# Patient Record
Sex: MALE | Race: WHITE | Employment: OTHER | URBAN - METROPOLITAN AREA
[De-identification: names, ages, dates, MRNs, and addresses within clinical notes are randomized per-mention and may not be internally consistent; named-entity substitution may affect disease eponyms.]

---

## 2017-01-27 ENCOUNTER — OFFICE VISIT (OUTPATIENT)
Dept: INTERNAL MEDICINE | Age: 42
End: 2017-01-27

## 2017-01-27 VITALS
BODY MASS INDEX: 30.22 KG/M2 | SYSTOLIC BLOOD PRESSURE: 100 MMHG | DIASTOLIC BLOOD PRESSURE: 70 MMHG | HEIGHT: 73 IN | WEIGHT: 228 LBS

## 2017-01-27 DIAGNOSIS — L98.9 SKIN LESION OF CHEST WALL: Primary | ICD-10-CM

## 2017-01-27 PROCEDURE — 99213 OFFICE O/P EST LOW 20 MIN: CPT | Performed by: INTERNAL MEDICINE

## 2017-01-27 ASSESSMENT — ENCOUNTER SYMPTOMS
EYE REDNESS: 0
ABDOMINAL PAIN: 0
BACK PAIN: 0
SHORTNESS OF BREATH: 0
CHEST TIGHTNESS: 0
NAUSEA: 0

## 2017-07-12 ENCOUNTER — OFFICE VISIT (OUTPATIENT)
Dept: DERMATOLOGY | Age: 42
End: 2017-07-12

## 2017-07-12 DIAGNOSIS — D18.00 HEMANGIOMA: Primary | ICD-10-CM

## 2017-07-12 DIAGNOSIS — D22.9 BENIGN NEVUS: ICD-10-CM

## 2017-07-12 DIAGNOSIS — D48.5 NEOPLASM OF UNCERTAIN BEHAVIOR OF SKIN: ICD-10-CM

## 2017-07-12 DIAGNOSIS — L57.8 DIFFUSE PHOTODAMAGE OF SKIN: ICD-10-CM

## 2017-07-12 PROCEDURE — 99242 OFF/OP CONSLTJ NEW/EST SF 20: CPT | Performed by: DERMATOLOGY

## 2017-07-12 PROCEDURE — 11301 SHAVE SKIN LESION 0.6-1.0 CM: CPT | Performed by: DERMATOLOGY

## 2017-07-12 PROCEDURE — 11100 PR BIOPSY OF SKIN LESION: CPT | Performed by: DERMATOLOGY

## 2017-07-12 PROCEDURE — 11101 PR BIOPSY, EACH ADDED LESION: CPT | Performed by: DERMATOLOGY

## 2017-07-20 ENCOUNTER — TELEPHONE (OUTPATIENT)
Dept: DERMATOLOGY | Age: 42
End: 2017-07-20

## 2017-11-03 ENCOUNTER — OFFICE VISIT (OUTPATIENT)
Dept: INTERNAL MEDICINE | Age: 42
End: 2017-11-03

## 2017-11-03 VITALS
DIASTOLIC BLOOD PRESSURE: 70 MMHG | BODY MASS INDEX: 29.82 KG/M2 | OXYGEN SATURATION: 96 % | TEMPERATURE: 98.8 F | WEIGHT: 225 LBS | HEART RATE: 74 BPM | HEIGHT: 73 IN | SYSTOLIC BLOOD PRESSURE: 110 MMHG

## 2017-11-03 DIAGNOSIS — J40 BRONCHITIS: Primary | ICD-10-CM

## 2017-11-03 DIAGNOSIS — Z23 NEEDS FLU SHOT: ICD-10-CM

## 2017-11-03 PROCEDURE — 99213 OFFICE O/P EST LOW 20 MIN: CPT | Performed by: NURSE PRACTITIONER

## 2017-11-03 PROCEDURE — 90686 IIV4 VACC NO PRSV 0.5 ML IM: CPT | Performed by: NURSE PRACTITIONER

## 2017-11-03 PROCEDURE — 90471 IMMUNIZATION ADMIN: CPT | Performed by: NURSE PRACTITIONER

## 2017-11-03 RX ORDER — METHYLPREDNISOLONE 4 MG/1
TABLET ORAL
Qty: 1 KIT | Refills: 0 | Status: SHIPPED | OUTPATIENT
Start: 2017-11-03 | End: 2017-11-09

## 2017-11-03 NOTE — PROGRESS NOTES
Subjective:      Patient ID: Charity Bernal is a 40 y.o. male. HPI   Patient comes to the office complaining of cough, congestion, drainage that has been present for the last several days. His cough is productive of yellow sputum. Patient does not have associated fever or chills. Patient denies gastrointestinal symptoms related to His present condition. Taking nyquil daily, which has been helping, but symptoms lingering for 4 weeks. Denies any sob, cp, or wheezing. Past Medical History:   Diagnosis Date    GERD (gastroesophageal reflux disease)      History reviewed. No pertinent surgical history. Family History   Problem Relation Age of Onset    Hypertension Mother     Heart Disease Father 46     Social History     Social History    Marital status:      Spouse name: N/A    Number of children: 2    Years of education: N/A     Occupational History    Not on file. Social History Main Topics    Smoking status: Never Smoker    Smokeless tobacco: Never Used    Alcohol use Yes      Comment: occasionally    Drug use: No    Sexual activity: Yes     Partners: Female     Other Topics Concern    Not on file     Social History Narrative    No narrative on file       Review of Systems   Constitutional: Positive for fatigue. Negative for chills, diaphoresis and fever. HENT: Positive for postnasal drip, rhinorrhea, sinus pain and sinus pressure. Negative for congestion. Respiratory: Positive for cough. Negative for shortness of breath and wheezing. Cardiovascular: Negative for chest pain and palpitations. Allergic/Immunologic: Positive for environmental allergies. Neurological: Positive for headaches. Hematological: Negative for adenopathy.        Objective:     Vitals:    11/03/17 1049   BP: 110/70   Site: Left Arm   Position: Sitting   Cuff Size: Medium Adult   Pulse: 74   Temp: 98.8 °F (37.1 °C)   SpO2: 96%   Weight: 225 lb (102.1 kg)   Height: 6' 1\" (1.854 m)     Physical Exam Constitutional: He appears well-developed and well-nourished. HENT:   Right Ear: Hearing, tympanic membrane, external ear and ear canal normal.   Left Ear: Hearing, tympanic membrane, external ear and ear canal normal.   Nose: No mucosal edema or rhinorrhea. Right sinus exhibits no maxillary sinus tenderness and no frontal sinus tenderness. Left sinus exhibits no maxillary sinus tenderness and no frontal sinus tenderness. Mouth/Throat: No oropharyngeal exudate, posterior oropharyngeal edema, posterior oropharyngeal erythema or tonsillar abscesses. Cardiovascular: Normal rate, regular rhythm and normal heart sounds. Pulmonary/Chest: Effort normal. He has decreased breath sounds in the right lower field and the left lower field. He has wheezes in the right lower field and the left lower field. Lymphadenopathy:        Head (right side): No submental, no submandibular, no tonsillar, no preauricular, no posterior auricular and no occipital adenopathy present. Head (left side): No submental, no submandibular, no tonsillar, no preauricular, no posterior auricular and no occipital adenopathy present. He has no cervical adenopathy. Skin: Skin is warm and dry. Current Outpatient Prescriptions   Medication Sig Dispense Refill    methylPREDNISolone (MEDROL DOSEPACK) 4 MG tablet Take by mouth. 1 kit 0    omeprazole (PRILOSEC) 20 MG capsule Take 20 mg by mouth daily. No current facility-administered medications for this visit. Assessment:     1. Bronchitis    2. Needs flu shot      Plan:   1. Bronchitis  - Antibiotic not indicated, discussed rationale of not giving antibiotics due to, but not limited to, side effects and build up of resistance. Advised if fever develops, symptoms worsen, or fail to improve with symptom management to return to office.    - methylPREDNISolone (MEDROL DOSEPACK) 4 MG tablet; Take by mouth. Dispense: 1 kit; Refill: 0    2.  Needs flu shot  - Alina Wilson 3 YRS AND OLDER, IM, PF, PREFILL SYR OR SDV, 0.5ML (FLUZONE QUADV, PF)        Discussed use, benefit, and side effects of all prescribed medications. Barriers to medication compliance addressed. All patient questions answered. Pt voiced understanding. All patient questions answered. Pt voiced understanding.

## 2017-11-06 ASSESSMENT — ENCOUNTER SYMPTOMS
SINUS PAIN: 1
SHORTNESS OF BREATH: 0
WHEEZING: 0
SINUS PRESSURE: 1
RHINORRHEA: 1
COUGH: 1

## 2019-12-30 ENCOUNTER — TELEPHONE (OUTPATIENT)
Dept: INTERNAL MEDICINE CLINIC | Age: 44
End: 2019-12-30

## 2019-12-30 DIAGNOSIS — L30.9 DERMATITIS: Primary | ICD-10-CM

## 2020-01-10 ENCOUNTER — TELEPHONE (OUTPATIENT)
Dept: INTERNAL MEDICINE CLINIC | Age: 45
End: 2020-01-10

## 2020-01-10 NOTE — TELEPHONE ENCOUNTER
[phone] (home)   Sent to MD GLORY ÁLVAREZ on pt phone.  I attempted to get a little more information on why pt needs referral for Dermatology group

## 2021-02-17 ENCOUNTER — TELEPHONE (OUTPATIENT)
Dept: INTERNAL MEDICINE CLINIC | Age: 46
End: 2021-02-17

## 2021-02-17 DIAGNOSIS — R07.89 OTHER CHEST PAIN: Primary | ICD-10-CM

## 2021-02-17 NOTE — TELEPHONE ENCOUNTER
Stress test ordered, he can call scheduling at [phone] to check about getting a date set for the test.     Orders Placed This Encounter   Procedures    CARDIAC STRESS TEST EXERCISE ONLY     Standing Status:   Future     Standing Expiration Date:   8/17/2021     Order Specific Question:   Reason for Exam?     Answer:   Chest pain     Order Specific Question:   Reason for Exam?     Answer:   Shortness of breath

## 2021-02-19 ENCOUNTER — HOSPITAL ENCOUNTER (OUTPATIENT)
Dept: NON INVASIVE DIAGNOSTICS | Age: 46
Discharge: HOME | End: 2021-02-19
Payer: OTHER GOVERNMENT

## 2021-02-19 DIAGNOSIS — R07.89 OTHER CHEST PAIN: ICD-10-CM

## 2021-02-19 PROCEDURE — 93017 CV STRESS TEST TRACING ONLY: CPT

## 2023-01-06 ENCOUNTER — OFFICE VISIT (OUTPATIENT)
Dept: URGENT CARE | Age: 48
End: 2023-01-06

## 2023-01-06 VITALS
TEMPERATURE: 98 F | OXYGEN SATURATION: 97 % | BODY MASS INDEX: 29.03 KG/M2 | RESPIRATION RATE: 12 BRPM | HEART RATE: 68 BPM | HEIGHT: 73 IN | WEIGHT: 219 LBS | DIASTOLIC BLOOD PRESSURE: 70 MMHG | SYSTOLIC BLOOD PRESSURE: 110 MMHG

## 2023-01-06 DIAGNOSIS — J20.9 ACUTE BRONCHITIS, UNSPECIFIED ORGANISM: Primary | ICD-10-CM

## 2023-01-06 RX ORDER — BENZONATATE 200 MG/1
200 CAPSULE ORAL 3 TIMES DAILY PRN
Qty: 21 CAPSULE | Refills: 0 | Status: SHIPPED | OUTPATIENT
Start: 2023-01-06 | End: 2023-01-13

## 2023-01-06 RX ORDER — ALBUTEROL SULFATE 90 UG/1
2 AEROSOL, METERED RESPIRATORY (INHALATION) EVERY 6 HOURS PRN
Qty: 18 G | Refills: 0 | Status: SHIPPED | OUTPATIENT
Start: 2023-01-06

## 2023-01-06 RX ORDER — PREDNISONE 20 MG/1
20 TABLET ORAL 2 TIMES DAILY
Qty: 10 TABLET | Refills: 0 | Status: SHIPPED | OUTPATIENT
Start: 2023-01-06 | End: 2023-01-11

## 2023-01-06 ASSESSMENT — ENCOUNTER SYMPTOMS
SINUS PAIN: 0
STRIDOR: 0
SORE THROAT: 1
COUGH: 1
SINUS PRESSURE: 0
SHORTNESS OF BREATH: 0
CHOKING: 0
APNEA: 0
WHEEZING: 0
CHEST TIGHTNESS: 0
GASTROINTESTINAL NEGATIVE: 1

## 2023-01-06 NOTE — PROGRESS NOTES
Lesa De Anda (:  1975) is a 56 y.o. male,New patient, here for evaluation of the following chief complaint(s):  Cough (X month and a half)      ASSESSMENT/PLAN:    ICD-10-CM    1. Acute bronchitis, unspecified organism  J20.9 predniSONE (DELTASONE) 20 MG tablet     albuterol sulfate HFA (PROVENTIL HFA) 108 (90 Base) MCG/ACT inhaler     benzonatate (TESSALON) 200 MG capsule          Return if symptoms worsen or fail to improve. SUBJECTIVE/OBJECTIVE:  56year old male presents with c/o persistent cough for 1 month. He has mild sore throat with PND. Denies known fever, chills or body aches. He denies known sick exposure. He has been treating with Dayquil and Delsym and cough drops with short term. Last taken 12 hours ago. Denies ATB treatment in the past 6 months. History provided by:  Patient   used: No      Vitals:    23 1048   BP: 110/70   Pulse: 68   Resp: 12   Temp: 98 °F (36.7 °C)   TempSrc: Oral   SpO2: 97%   Weight: 219 lb (99.3 kg)   Height: 6' 1\" (1.854 m)       Review of Systems   Constitutional: Negative. HENT:  Positive for sore throat. Negative for congestion, ear pain, sinus pressure and sinus pain. Respiratory:  Positive for cough. Negative for apnea, choking, chest tightness, shortness of breath, wheezing and stridor. Dry non-productive cough    Cardiovascular: Negative. Gastrointestinal: Negative. Neurological:  Negative for headaches. Physical Exam  Vitals reviewed. Constitutional:       General: He is not in acute distress. Appearance: Normal appearance. He is not ill-appearing. HENT:      Head: Normocephalic. Right Ear: Tympanic membrane, ear canal and external ear normal. There is no impacted cerumen. Left Ear: Tympanic membrane, ear canal and external ear normal. There is no impacted cerumen. Nose: Nose normal. No congestion or rhinorrhea.       Mouth/Throat:      Mouth: Mucous membranes are moist.      Pharynx: Posterior oropharyngeal erythema present. No oropharyngeal exudate. Cardiovascular:      Rate and Rhythm: Normal rate and regular rhythm. Pulmonary:      Effort: Pulmonary effort is normal.      Breath sounds: Normal breath sounds. Comments: Dry hacking cough on exam   Skin:     General: Skin is warm and dry. Neurological:      Mental Status: He is alert and oriented to person, place, and time. Psychiatric:         Behavior: Behavior normal.         An electronic signature was used to authenticate this note.     --AGUSTÍN Valdez - CNP

## 2023-01-16 SDOH — HEALTH STABILITY: PHYSICAL HEALTH: ON AVERAGE, HOW MANY DAYS PER WEEK DO YOU ENGAGE IN MODERATE TO STRENUOUS EXERCISE (LIKE A BRISK WALK)?: 4 DAYS

## 2023-01-16 SDOH — HEALTH STABILITY: PHYSICAL HEALTH: ON AVERAGE, HOW MANY MINUTES DO YOU ENGAGE IN EXERCISE AT THIS LEVEL?: 50 MIN

## 2023-01-17 ENCOUNTER — OFFICE VISIT (OUTPATIENT)
Dept: PRIMARY CARE CLINIC | Age: 48
End: 2023-01-17
Payer: OTHER GOVERNMENT

## 2023-01-17 ENCOUNTER — HOSPITAL ENCOUNTER (OUTPATIENT)
Dept: GENERAL RADIOLOGY | Age: 48
Discharge: HOME | End: 2023-01-17
Payer: OTHER GOVERNMENT

## 2023-01-17 VITALS
TEMPERATURE: 98.3 F | OXYGEN SATURATION: 98 % | SYSTOLIC BLOOD PRESSURE: 110 MMHG | WEIGHT: 220 LBS | DIASTOLIC BLOOD PRESSURE: 80 MMHG | BODY MASS INDEX: 29.16 KG/M2 | HEIGHT: 73 IN | HEART RATE: 86 BPM

## 2023-01-17 DIAGNOSIS — N52.9 ERECTILE DYSFUNCTION, UNSPECIFIED ERECTILE DYSFUNCTION TYPE: ICD-10-CM

## 2023-01-17 DIAGNOSIS — R05.3 CHRONIC COUGH: ICD-10-CM

## 2023-01-17 DIAGNOSIS — K21.9 GASTROESOPHAGEAL REFLUX DISEASE, UNSPECIFIED WHETHER ESOPHAGITIS PRESENT: ICD-10-CM

## 2023-01-17 DIAGNOSIS — N40.1 BENIGN PROSTATIC HYPERPLASIA WITH NOCTURIA: ICD-10-CM

## 2023-01-17 DIAGNOSIS — R35.1 BENIGN PROSTATIC HYPERPLASIA WITH NOCTURIA: ICD-10-CM

## 2023-01-17 DIAGNOSIS — R05.3 CHRONIC COUGH: Primary | ICD-10-CM

## 2023-01-17 DIAGNOSIS — Z13.1 SCREENING FOR DIABETES MELLITUS: ICD-10-CM

## 2023-01-17 DIAGNOSIS — Z13.220 ENCOUNTER FOR SCREENING FOR LIPOID DISORDERS: ICD-10-CM

## 2023-01-17 DIAGNOSIS — R35.0 URINARY FREQUENCY: ICD-10-CM

## 2023-01-17 PROCEDURE — 71046 X-RAY EXAM CHEST 2 VIEWS: CPT

## 2023-01-17 PROCEDURE — 99204 OFFICE O/P NEW MOD 45 MIN: CPT | Performed by: STUDENT IN AN ORGANIZED HEALTH CARE EDUCATION/TRAINING PROGRAM

## 2023-01-17 RX ORDER — TAMSULOSIN HYDROCHLORIDE 0.4 MG/1
0.4 CAPSULE ORAL DAILY
Qty: 30 CAPSULE | Refills: 0 | Status: SHIPPED | OUTPATIENT
Start: 2023-01-17

## 2023-01-17 SDOH — ECONOMIC STABILITY: FOOD INSECURITY: WITHIN THE PAST 12 MONTHS, THE FOOD YOU BOUGHT JUST DIDN'T LAST AND YOU DIDN'T HAVE MONEY TO GET MORE.: NEVER TRUE

## 2023-01-17 SDOH — ECONOMIC STABILITY: FOOD INSECURITY: WITHIN THE PAST 12 MONTHS, YOU WORRIED THAT YOUR FOOD WOULD RUN OUT BEFORE YOU GOT MONEY TO BUY MORE.: NEVER TRUE

## 2023-01-17 ASSESSMENT — PATIENT HEALTH QUESTIONNAIRE - PHQ9
SUM OF ALL RESPONSES TO PHQ QUESTIONS 1-9: 0
SUM OF ALL RESPONSES TO PHQ QUESTIONS 1-9: 0
1. LITTLE INTEREST OR PLEASURE IN DOING THINGS: 0
SUM OF ALL RESPONSES TO PHQ QUESTIONS 1-9: 0
7. TROUBLE CONCENTRATING ON THINGS, SUCH AS READING THE NEWSPAPER OR WATCHING TELEVISION: 0
8. MOVING OR SPEAKING SO SLOWLY THAT OTHER PEOPLE COULD HAVE NOTICED. OR THE OPPOSITE, BEING SO FIGETY OR RESTLESS THAT YOU HAVE BEEN MOVING AROUND A LOT MORE THAN USUAL: 0
SUM OF ALL RESPONSES TO PHQ QUESTIONS 1-9: 0
3. TROUBLE FALLING OR STAYING ASLEEP: 0
5. POOR APPETITE OR OVEREATING: 0
2. FEELING DOWN, DEPRESSED OR HOPELESS: 0
10. IF YOU CHECKED OFF ANY PROBLEMS, HOW DIFFICULT HAVE THESE PROBLEMS MADE IT FOR YOU TO DO YOUR WORK, TAKE CARE OF THINGS AT HOME, OR GET ALONG WITH OTHER PEOPLE: 0
6. FEELING BAD ABOUT YOURSELF - OR THAT YOU ARE A FAILURE OR HAVE LET YOURSELF OR YOUR FAMILY DOWN: 0
SUM OF ALL RESPONSES TO PHQ9 QUESTIONS 1 & 2: 0
9. THOUGHTS THAT YOU WOULD BE BETTER OFF DEAD, OR OF HURTING YOURSELF: 0
4. FEELING TIRED OR HAVING LITTLE ENERGY: 0

## 2023-01-17 ASSESSMENT — SOCIAL DETERMINANTS OF HEALTH (SDOH): HOW HARD IS IT FOR YOU TO PAY FOR THE VERY BASICS LIKE FOOD, HOUSING, MEDICAL CARE, AND HEATING?: NOT HARD AT ALL

## 2023-01-17 NOTE — PATIENT INSTRUCTIONS
Lifestyle modifications include:    ?Limiting fluid intake before bedtime or prior to travel    ? Limiting intake of mild diuretics (eg, caffeine, alcohol)    ? Limiting intake of bladder irritants (eg, highly seasoned or irritative foods)    ? Avoiding constipation    ? Increasing activity, including regular strenuous exercise    ? Weight control

## 2023-01-17 NOTE — PROGRESS NOTES
[facility]  [facility]  [address] [place] [address]  [address]  [facility] [id number]  Dept: [phone]  Dept Fax: [phone]  Loc: [phone]      Jose Miguel Oropeza is a 47 y.o. male who presents today for:  Chief Complaint   Patient presents with    Cough     Cough  negative   went  to  [facility]  urgent  care     Urinary Frequency     Up  at night   a lot  thinks  prostate    Gastroesophageal Reflux     HPI:   Jose Miguel Oropeza is 47 y.o. who presents today for     Cough - 1.5 months of cough after viral URI. Son had COVID. Tested negative. Cough has persisted. HA, sore throat resolved. Cough comes and goes. Has worsened the last several days. Had tightness in chest. No SOB. Notes some wheezing in throat. Went to , treated with steroids and albuterol w/o improvement. This has happened over the years. Cough is nonproductive. Never smoker. No history of asthma. Albuterol did not improve symptoms.     GERD - takes prilosec daily. Controlled. No epigastric pain.     No other chronic conditions.   No medications except     Urinary frequency - waking every 2 hours at night for several years. During the daytime, difficulty to initiate stream. No morning erections, difficulty maintaining an erection. Urinary stream is weaker.       Objective:     Vitals:    01/17/23 1337   BP: 110/80   Pulse: 86   Temp: 98.3 °F (36.8 °C)   SpO2: 98%         Wt Readings from Last 3 Encounters:   01/17/23 220 lb (99.8 kg)   01/06/23 219 lb (99.3 kg)   11/03/17 225 lb (102.1 kg)       BP Readings from Last 3 Encounters:   01/17/23 110/80   01/06/23 110/70   11/03/17 110/70       Lab Results   Component Value Date    WBC 6.7 12/22/2013    HGB 14.0 12/22/2013    HCT 41.2 12/22/2013    MCV 89.9 12/22/2013     12/22/2013     Lab Results   Component Value Date    CO2 29 12/22/2013    CREATININE 1.2 12/22/2013    LABGLOM >60 12/22/2013    GFRAA >60 12/22/2013       Physical Exam  Vitals and nursing note reviewed.  Constitutional:       General: He is not in acute distress. Appearance: He is well-developed. He is not ill-appearing or diaphoretic. HENT:      Head: Normocephalic and atraumatic. Right Ear: External ear normal.      Left Ear: External ear normal.      Nose: Nose normal.   Eyes:      General: No scleral icterus. Right eye: No discharge. Left eye: No discharge. Conjunctiva/sclera: Conjunctivae normal.   Cardiovascular:      Rate and Rhythm: Normal rate and regular rhythm. Heart sounds: Normal heart sounds. No murmur heard. Pulmonary:      Effort: Pulmonary effort is normal. No respiratory distress. Breath sounds: Normal breath sounds. No wheezing or rales. Comments: Frequent dry cough  Abdominal:      General: Abdomen is flat. Palpations: Abdomen is soft. Tenderness: There is abdominal tenderness (suprapubic). There is no guarding or rebound. Musculoskeletal:      Cervical back: Normal range of motion. Skin:     General: Skin is warm and dry. Findings: No erythema or rash. Neurological:      Mental Status: He is alert and oriented to person, place, and time. Psychiatric:         Behavior: Behavior normal.         Thought Content:  Thought content normal.         Judgment: Judgment normal.       Immunization History   Administered Date(s) Administered    COVID-19, PFIZER Bivalent BOOSTER, DO NOT Dilute, (age 12y+), IM, 30 mcg/0.3 mL 11/10/2022    COVID-19, PFIZER PURPLE top, DILUTE for use, (age 15 y+), 30mcg/0.3mL 03/07/2021, 03/25/2021, 11/23/2021    Influenza Virus Vaccine 11/10/2022    Influenza, FLUARIX, FLULAVAL, Louvenia Denisa (age 10 mo+) AND AFLURIA, (age 1 y+), PF, 0.5mL 11/03/2017, 11/02/2018, 10/16/2020, 11/23/2021    Influenza, FLUCELVAX, (age 10 mo+), MDCK, PF, 0.5mL 11/10/2022    Tdap (Boostrix, Adacel) 08/10/2010, 12/24/2018       Health Maintenance Due   Topic Date Due    Depression Screen  Never done    Hepatitis C screen  Never done Diabetes screen  Never done    Lipids  Never done    Colorectal Cancer Screen  Never done       Food Insecurity: No Food Insecurity    Worried About Running Out of Food in the Last Year: Never true    Ran Out of Food in the Last Year: Never true       Assessment / Plan:   1. Chronic cough  Persistent, etiology post viral vs. GERD. Never smoker though did grow up with second hand smoke. Infrequent etoh use. No asthma history, but has had chronic coughs in the past.   Increase prilosec to 40 mg daily  Return in 2-4 weeks, if no improvement -- PFT and ENT referral   - XR CHEST STANDARD (2 VW); Future    2. Gastroesophageal reflux disease, unspecified whether esophagitis present  Chronic, controlled. Due to worsening cough, potential for silent reflux? Increase prilosec to 40 mg daily. Check CBC  - CBC with Auto Differential; Future    3. Benign prostatic hyperplasia with nocturia  Chronic, worsening   Trial of flomax daily  Offered urology referral, pt declined. Urology referral if no response to flomax  - tamsulosin (FLOMAX) 0.4 MG capsule; Take 1 capsule by mouth daily  Dispense: 30 capsule; Refill: 0    4. Urinary frequency  R/o infection vs. Secondary cause to urinary symptoms  - Urinalysis    5. Erectile dysfunction, unspecified erectile dysfunction type  Chronic, discuss in depth at next visit. No AM erections point to neurologic vs vascular insufficiency. -TSH  -Testosterone    6. Encounter for screening for lipoid disorders  - Lipid, Fasting; Future    7. Screening for diabetes mellitus  - Comprehensive Metabolic Panel, Fasting; Future          Preventative Medicine   Colonoscopy: discuss next visit       Specialists:   None        Return in about 2 weeks (around 1/31/2023) for urinary freq. Future Appointments   Date Time Provider [place]   2/6/2023  9:00 AM DO KELVIN Gonzalez [place] - DYD       Patient given educational materials - see patient instructions.   Discussed use, benefit, and sideeffects of prescribed medications. All patient questions answered. Pt voiced understanding. Reviewed health maintenance. Instructed to continue current medications, diet and exercise. Patient agreed with treatment plan. Follow up as directed.      Electronically signed by Anderson Lama DO on 1/17/2023 at 2:23 PM

## 2023-01-20 DIAGNOSIS — Z13.1 SCREENING FOR DIABETES MELLITUS: ICD-10-CM

## 2023-01-20 DIAGNOSIS — K21.9 GASTROESOPHAGEAL REFLUX DISEASE, UNSPECIFIED WHETHER ESOPHAGITIS PRESENT: ICD-10-CM

## 2023-01-20 DIAGNOSIS — Z13.220 ENCOUNTER FOR SCREENING FOR LIPOID DISORDERS: ICD-10-CM

## 2023-01-20 DIAGNOSIS — N52.9 ERECTILE DYSFUNCTION, UNSPECIFIED ERECTILE DYSFUNCTION TYPE: ICD-10-CM

## 2023-01-20 LAB
A/G RATIO: 1.6 (ref 1.1–2.2)
ALBUMIN SERPL-MCNC: 4.1 G/DL (ref 3.4–5)
ALP BLD-CCNC: 56 U/L (ref 40–129)
ALT SERPL-CCNC: 16 U/L (ref 10–40)
ANION GAP SERPL CALCULATED.3IONS-SCNC: 10 MMOL/L (ref 3–16)
AST SERPL-CCNC: 17 U/L (ref 15–37)
BASOPHILS ABSOLUTE: 0.1 K/UL (ref 0–0.2)
BASOPHILS RELATIVE PERCENT: 0.9 %
BILIRUB SERPL-MCNC: 0.3 MG/DL (ref 0–1)
BUN BLDV-MCNC: 11 MG/DL (ref 7–20)
CALCIUM SERPL-MCNC: 9.4 MG/DL (ref 8.3–10.6)
CHLORIDE BLD-SCNC: 104 MMOL/L (ref 99–110)
CHOLESTEROL, FASTING: 207 MG/DL (ref 0–199)
CO2: 24 MMOL/L (ref 21–32)
CREAT SERPL-MCNC: 1 MG/DL (ref 0.9–1.3)
EOSINOPHILS ABSOLUTE: 0.3 K/UL (ref 0–0.6)
EOSINOPHILS RELATIVE PERCENT: 4.5 %
GFR SERPL CREATININE-BSD FRML MDRD: >60 ML/MIN/{1.73_M2}
GLUCOSE FASTING: 95 MG/DL (ref 70–99)
HCT VFR BLD CALC: 36.4 % (ref 40.5–52.5)
HDLC SERPL-MCNC: 51 MG/DL (ref 40–60)
HEMOGLOBIN: 11.7 G/DL (ref 13.5–17.5)
LDL CHOLESTEROL CALCULATED: 135 MG/DL
LYMPHOCYTES ABSOLUTE: 2.4 K/UL (ref 1–5.1)
LYMPHOCYTES RELATIVE PERCENT: 40.9 %
MCH RBC QN AUTO: 27.2 PG (ref 26–34)
MCHC RBC AUTO-ENTMCNC: 32.2 G/DL (ref 31–36)
MCV RBC AUTO: 84.7 FL (ref 80–100)
MONOCYTES ABSOLUTE: 0.5 K/UL (ref 0–1.3)
MONOCYTES RELATIVE PERCENT: 8.5 %
NEUTROPHILS ABSOLUTE: 2.6 K/UL (ref 1.7–7.7)
NEUTROPHILS RELATIVE PERCENT: 45.2 %
PDW BLD-RTO: 14.2 % (ref 12.4–15.4)
PLATELET # BLD: 248 K/UL (ref 135–450)
PMV BLD AUTO: 9.7 FL (ref 5–10.5)
POTASSIUM SERPL-SCNC: 4.1 MMOL/L (ref 3.5–5.1)
RBC # BLD: 4.3 M/UL (ref 4.2–5.9)
SODIUM BLD-SCNC: 138 MMOL/L (ref 136–145)
T4 FREE: 1.1 NG/DL (ref 0.9–1.8)
TOTAL PROTEIN: 6.6 G/DL (ref 6.4–8.2)
TRIGLYCERIDE, FASTING: 105 MG/DL (ref 0–150)
TSH REFLEX FT4: 5.82 UIU/ML (ref 0.27–4.2)
VLDLC SERPL CALC-MCNC: 21 MG/DL
WBC # BLD: 5.8 K/UL (ref 4–11)

## 2023-01-24 LAB
SEX HORMONE BINDING GLOBULIN: 32 NMOL/L (ref 11–80)
TESTOSTERONE FREE-NONMALE: 98.6 PG/ML (ref 47–244)
TESTOSTERONE TOTAL: 466 NG/DL (ref 220–1000)

## 2023-02-07 ENCOUNTER — OFFICE VISIT (OUTPATIENT)
Dept: PRIMARY CARE CLINIC | Age: 48
End: 2023-02-07
Payer: OTHER GOVERNMENT

## 2023-02-07 VITALS
HEIGHT: 73 IN | HEART RATE: 78 BPM | BODY MASS INDEX: 29 KG/M2 | WEIGHT: 218.8 LBS | OXYGEN SATURATION: 98 % | TEMPERATURE: 97.3 F | DIASTOLIC BLOOD PRESSURE: 70 MMHG | SYSTOLIC BLOOD PRESSURE: 106 MMHG

## 2023-02-07 DIAGNOSIS — R05.3 CHRONIC COUGH: Primary | ICD-10-CM

## 2023-02-07 DIAGNOSIS — R35.1 BENIGN PROSTATIC HYPERPLASIA WITH NOCTURIA: ICD-10-CM

## 2023-02-07 DIAGNOSIS — E78.00 ELEVATED CHOLESTEROL: ICD-10-CM

## 2023-02-07 DIAGNOSIS — D50.0 NORMOCYTIC ANEMIA DUE TO BLOOD LOSS: ICD-10-CM

## 2023-02-07 DIAGNOSIS — K21.9 GASTROESOPHAGEAL REFLUX DISEASE, UNSPECIFIED WHETHER ESOPHAGITIS PRESENT: ICD-10-CM

## 2023-02-07 DIAGNOSIS — E03.8 SUBCLINICAL HYPOTHYROIDISM: ICD-10-CM

## 2023-02-07 DIAGNOSIS — N40.1 BENIGN PROSTATIC HYPERPLASIA WITH NOCTURIA: ICD-10-CM

## 2023-02-07 DIAGNOSIS — Z12.11 SCREEN FOR COLON CANCER: ICD-10-CM

## 2023-02-07 DIAGNOSIS — N52.9 ERECTILE DYSFUNCTION, UNSPECIFIED ERECTILE DYSFUNCTION TYPE: ICD-10-CM

## 2023-02-07 PROCEDURE — 99214 OFFICE O/P EST MOD 30 MIN: CPT | Performed by: STUDENT IN AN ORGANIZED HEALTH CARE EDUCATION/TRAINING PROGRAM

## 2023-02-07 RX ORDER — LEVOTHYROXINE SODIUM 0.03 MG/1
25 TABLET ORAL DAILY
Qty: 30 TABLET | Refills: 1 | Status: SHIPPED | OUTPATIENT
Start: 2023-02-07 | End: 2023-03-09

## 2023-02-07 RX ORDER — TADALAFIL 10 MG/1
10 TABLET ORAL DAILY PRN
Qty: 30 TABLET | Refills: 1 | Status: CANCELLED | OUTPATIENT
Start: 2023-02-07 | End: 2023-03-09

## 2023-02-07 RX ORDER — TAMSULOSIN HYDROCHLORIDE 0.4 MG/1
0.4 CAPSULE ORAL DAILY
Qty: 90 CAPSULE | Refills: 1 | Status: SHIPPED | OUTPATIENT
Start: 2023-02-07 | End: 2023-05-08

## 2023-02-07 SDOH — ECONOMIC STABILITY: HOUSING INSECURITY
IN THE LAST 12 MONTHS, WAS THERE A TIME WHEN YOU DID NOT HAVE A STEADY PLACE TO SLEEP OR SLEPT IN A SHELTER (INCLUDING NOW)?: NO

## 2023-02-07 SDOH — ECONOMIC STABILITY: FOOD INSECURITY: WITHIN THE PAST 12 MONTHS, YOU WORRIED THAT YOUR FOOD WOULD RUN OUT BEFORE YOU GOT MONEY TO BUY MORE.: NEVER TRUE

## 2023-02-07 SDOH — ECONOMIC STABILITY: INCOME INSECURITY: HOW HARD IS IT FOR YOU TO PAY FOR THE VERY BASICS LIKE FOOD, HOUSING, MEDICAL CARE, AND HEATING?: NOT HARD AT ALL

## 2023-02-07 SDOH — ECONOMIC STABILITY: FOOD INSECURITY: WITHIN THE PAST 12 MONTHS, THE FOOD YOU BOUGHT JUST DIDN'T LAST AND YOU DIDN'T HAVE MONEY TO GET MORE.: NEVER TRUE

## 2023-02-07 NOTE — PATIENT INSTRUCTIONS
Learning About High Cholesterol  What is high cholesterol? High cholesterol means that you have too much cholesterol in your blood. Cholesterol is a type of fat. It's needed for many body functions, such as making new cells. Cholesterol is made by your body. It also comes from food youeat. Having high cholesterol can lead to the buildup of plaque in artery walls. Thiscan increase your risk of heart attack and stroke. When your doctor talks about high cholesterol levels, your doctor is talking about your total cholesterol and LDL cholesterol (the \"bad\" cholesterol) levels. Your doctor may also speak about HDL (the \"good\" cholesterol) levels. High HDL is linked with a lower risk for coronary artery disease, heart attack,and stroke. Your cholesterol levels help your doctor find out your risk for having a heartattack or stroke. How can you help prevent high cholesterol? A heart-healthy lifestyle can help you prevent high cholesterol and lower yourrisk for a heart attack and stroke. Eat heart-healthy foods. Eat fruits, vegetables, whole grains, beans, and other high-fiber foods. Eat lean proteins, such as seafood, lean meats, beans, nuts, and soy products. Eat healthy fats, such as canola and olive oil. Choose foods that are low in saturated fat. Limit sodium and alcohol. Limit drinks and foods with added sugar. Be active. Try to do moderate activity at least 2½ hours a week. Or try vigorous activity at least 1¼ hours a week. You may want to walk or try other activities, such as running, swimming, cycling, or playing tennis or team sports. Stay at a healthy weight. Lose weight if you need to. Don't smoke. If you need help quitting, talk to your doctor about stop-smoking programs and medicines. These can increase your chances of quitting for good. How is high cholesterol treated? The goal of treatment is to reduce your chances of having a heart attack orstroke.  The goal is not to lower your cholesterol numbers only. Have a heart-healthy lifestyle. This includes eating healthy foods, not smoking, losing weight, and being more active. You may choose to take medicine. Follow-up care is a key part of your treatment and safety. Be sure to make and go to all appointments, and call your doctor if you are having problems. It's also a good idea to know your test results and keep alist of the medicines you take. Learning About Foods With Healthy Fats  What foods contain healthy fats? The foods you eat contain nutrients, such as vitamins and minerals. Fat is a nutrient. Your body needs the right amount to stay healthy and work as it should. You can use the list below to help you make choices about which foodsto eat. Here are some foods that contain healthy fats. Unsaturated fats and oils  Canola oil  Corn oil  Flaxseed oil  Olive oil  Peanut oil  Safflower oil  Sunflower oil  [address] [facility] trout  Mackerel  Oysters  [place]  Sardines  Nuts and seeds  Almonds  Duc seeds  Flaxseeds (ground)  Hazelnuts  Nut butters (such as peanut and almond)  Pumpkin seeds  Sunflower seeds  Walnuts  Fruits  Avocados  Olives  Work with your doctor to find out how much of this nutrient you need. Dependingon your health, you may need more or less of it in your diet. Learning About the [address] [facility] Diet  What is the Mediterranean diet? The Mediterranean diet is a style of eating rather than a diet plan. It features foods eaten in [place], [place], [place], and other countries along the [facility]. It emphasizes eating foods like fish, fruits, vegetables, beans, high-fiber breads and whole grains, nuts, and oliveoil. This style of eating includes limited red meat, cheese, and sweets. Why choose the Mediterranean diet? A Mediterranean-style diet may improve heart health. It contains more fat than other heart-healthy diets.  But the fats are mainly from nuts, unsaturated oils (such as fish oils and olive oil), and certain nut or seed oils (such as canola, soybean, or flaxseed oil). These fats may help protect the heart andblood vessels. How can you get started on the Mediterranean diet? Here are some things you can do to switch to a more Mediterranean way of eating. What to eat  Eat a variety of fruits and vegetables each day, such as grapes, blueberries, tomatoes, broccoli, peppers, figs, olives, spinach, eggplant, beans, lentils, and chickpeas. Eat a variety of whole-grain foods each day, such as oats, brown rice, and whole wheat bread, pasta, and couscous. Eat fish at least 2 times a week. Try tuna, salmon, mackerel, lake trout, herring, or sardines. Eat moderate amounts of low-fat dairy products, such as milk, cheese, or yogurt. Eat moderate amounts of poultry and eggs. Choose healthy (unsaturated) fats, such as nuts, olive oil, and certain nut or seed oils like canola, soybean, and flaxseed. Limit unhealthy (saturated) fats, such as butter, palm oil, and coconut oil. And limit fats found in animal products, such as meat and dairy products made with whole milk. Try to eat red meat only a few times a month in very small amounts. Limit sweets and desserts to only a few times a week. This includes sugar-sweetened drinks like soda. The Mediterranean diet may also include red wine with your meal--1 glass eachday for women and up to 2 glasses a day for men. Tips for eating at home  Use herbs, spices, garlic, lemon zest, and citrus juice instead of salt to add flavor to foods. Add avocado slices to your sandwich instead of salazar. Have fish for lunch or dinner instead of red meat. Brush the fish with olive oil, and broil or grill it. Sprinkle your salad with seeds or nuts instead of cheese. Cook with olive or canola oil instead of butter or oils that are high in saturated fat. Switch from 2% milk or whole milk to 1% or fat-free milk.   Dip raw vegetables in a vinaigrette dressing or hummus instead of dips made from mayonnaise or sour cream.  Have a piece of fruit for dessert instead of a piece of cake. Try baked apples, or have some dried fruit. Tips for eating out  Try broiled, grilled, baked, or poached fish instead of having it fried or breaded. Ask your  to have your meals prepared with olive oil instead of butter. Order dishes made with marinara sauce or sauces made from olive oil. Avoid sauces made from cream or mayonnaise. Choose whole-grain breads, whole wheat pasta and pizza crust, brown rice, beans, and lentils. Cut back on butter or margarine on bread. Instead, you can dip your bread in a small amount of olive oil. Ask for a side salad or grilled vegetables instead of french fries or chips. Walking for Exercise: Care Instructions  Your Care Instructions     Walking is one of the easiest ways to get the exercise you need for good health. A brisk, 30-minute walk each day can help you feel better and have more energy. It can help you lower your risk of disease. Walking can help you keepyour bones strong and your heart healthy. Check with your doctor before you start a walking plan if you have heart problems, other health issues, or you have not been active in a long time. Follow your doctor's instructions for safe levels of exercise. Follow-up care is a key part of your treatment and safety. Be sure to make and go to all appointments, and call your doctor if you are having problems. It's also a good idea to know your test results and keep alist of the medicines you take. How can you care for yourself at home? Getting started  Start slowly and set a short-term goal. For example, walk for 5 or 10 minutes every day. Bit by bit, increase the amount you walk every day. Try for at least 30 minutes on most days of the week. You also may want to swim, bike, or do other activities.   If finding enough time is a problem, it's fine to be active in shorter periods of time throughout your day. To get the heart-healthy benefits of walking, you need to walk briskly enough to increase your heart rate and breathing, but not so fast that you can't talk comfortably. Wear comfortable shoes that fit well and provide good support for your feet and ankles. Staying with your plan  After you've made walking a habit, set a longer-term goal. You may want to set a goal of walking briskly for longer or walking farther. Experts say to do 2½ hours (150 minutes) of moderate activity a week. A faster heartbeat is what defines moderate-level activity. To stay motivated, walk with friends, coworkers, or pets. Use a phone rebecca or pedometer to track your steps each day. Set a goal to increase your steps. When you reach that goal, set a higher goal.  If the weather keeps you from walking outside, go for walks at the mall with a friend. Local schools and [facility] may have indoor gyms where you can walk. Fitting a walk into your workday  Park several blocks away from work, or get off the bus a few stops early. Use the stairs instead of the elevator, at least for a few floors. Suggest holding meetings with colleagues during a walk inside or outside the building. Use the restroom that is the farthest from your desk or workstation. Use your morning and afternoon breaks to take quick 15 minutes walks. Staying safe  Know your surroundings. Walk in a well-lighted, safe place. If it's dark, walk with a partner. Wear light-colored clothing. If you can, buy a vest or jacket that reflects light. Carry a cell phone for emergencies. Drink plenty of water. Take a water bottle with you when you walk. This is very important if it is hot out. Be careful not to slip on wet or icy ground. You can buy \"grippers\" for your shoes to help keep you from slipping. Pay attention to your walking surface. Use sidewalks and paths.   If you have health issues such as asthma, COPD, or heart problems, or if you haven't been active for a long time, check with your doctor before you start a new activity.

## 2023-02-07 NOTE — PROGRESS NOTES
[address] [facility] PRIMARY CARE  [place] [address] [address] [facility] [address]  Dept: [phone]  Dept Fax: [phone]  Loc: [phone]      Fina Nettles is a 56 y.o. male who presents today for:  Chief Complaint   Patient presents with    Urinary Frequency     HPI:   Fina Nettles is 56 y.o. who presents today for follow-up of chronic cough, urinary symptoms. Cough-CXR wnl. Noticed improvement after increasing prilosec. GERD - prilosec increased to 40 mg last visit. BPH w/ LUTS- started flomax last visit. Previously waking q2 hours nightly. Now waking only   ED-testosterone labs wnl. Sxs improved after starting flomax. Hgb low, but donated blood prior to lab check. TSH elevated, feels fatigued.      Objective:     Vitals:    02/07/23 1101   BP: 106/70   Pulse: 78   Temp: 97.3 °F (36.3 °C)   SpO2: 98%         Wt Readings from Last 3 Encounters:   02/07/23 218 lb 12.8 oz (99.2 kg)   01/17/23 220 lb (99.8 kg)   01/06/23 219 lb (99.3 kg)       BP Readings from Last 3 Encounters:   02/07/23 106/70   01/17/23 110/80   01/06/23 110/70       Lab Results   Component Value Date    WBC 5.8 01/20/2023    HGB 11.7 (L) 01/20/2023    HCT 36.4 (L) 01/20/2023    MCV 84.7 01/20/2023     01/20/2023     Lab Results   Component Value Date     01/20/2023    K 4.1 01/20/2023     01/20/2023    CO2 24 01/20/2023    BUN 11 01/20/2023    CREATININE 1.0 01/20/2023    CALCIUM 9.4 01/20/2023    PROT 6.6 01/20/2023    LABALBU 4.1 01/20/2023    BILITOT 0.3 01/20/2023    ALKPHOS 56 01/20/2023    AST 17 01/20/2023    ALT 16 01/20/2023    LABGLOM >60 01/20/2023    GFRAA >60 12/22/2013    AGRATIO 1.6 01/20/2023     Lab Results   Component Value Date    TSHFT4 5.82 (H) 01/20/2023     Lab Results   Component Value Date    T4FREE 1.1 01/20/2023     Component Ref Range & Units 1/20/23 0801    Cholesterol, Fasting 0 - 199 mg/dL 207 High     Triglyceride, Fasting 0 - 150 mg/dL 105    HDL 40 - 60 mg/dL 51    LDL Calculated <100 mg/dL 135 High     VLDL Cholesterol Calculated Not Established mg/dL 21          Review of Systems    Physical Exam    Immunization History   Administered Date(s) Administered    COVID-19, PFIZER Bivalent BOOSTER, DO NOT Dilute, (age 12y+), IM, 30 mcg/0.3 mL 11/10/2022    COVID-19, PFIZER PURPLE top, DILUTE for use, (age 15 y+), 30mcg/0.3mL 03/07/2021, 03/25/2021, 11/23/2021    Influenza Virus Vaccine 11/10/2022    Influenza, FLUARIX, FLULAVAL, Glena Ficks (age 10 mo+) AND AFLURIA, (age 1 y+), PF, 0.5mL 11/03/2017, 11/02/2018, 10/16/2020, 11/23/2021    Influenza, FLUCELVAX, (age 10 mo+), MDCK, PF, 0.5mL 11/10/2022    Tdap (Boostrix, Adacel) 08/10/2010, 12/24/2018       Health Maintenance Due   Topic Date Due    Colorectal Cancer Screen  Never done       Food Insecurity: No Food Insecurity    Worried About Running Out of Food in the Last Year: Never true    Ran Out of Food in the Last Year: Never true       Assessment / Plan:   1. Chronic cough  Chronic, improving. Continue prilosec 40 mg daily. Call back if worsening - consider PFT, ICS for ?eosinoprilic esophagitis    2. Benign prostatic hyperplasia with nocturia  Chronic, improving. Continue Flomax daily  - tamsulosin (FLOMAX) 0.4 MG capsule; Take 1 capsule by mouth daily  Dispense: 90 capsule; Refill: 1    3. Subclinical hypothyroidism  New dx, check antibodies. Due to symptoms start synthroid. Counseled to take consistently due to absorption. Recheck TSH in 6 weeks. - Thyrotropin receptor antibody; Future  - Thyroid Peroxidase Antibody; Future  - TSH; Future  - levothyroxine (SYNTHROID) 25 MCG tablet; Take 1 tablet by mouth daily  Dispense: 30 tablet; Refill: 1    4. Erectile dysfunction, unspecified erectile dysfunction type  Improving after initiation of flomax, not on meds. Call back if worsening. 5. Gastroesophageal reflux disease, unspecified whether esophagitis present  Chronic, controlled.  Continue prilosec OTC 40 mg daily. 6. Elevated cholesterol  Continue diet and lifestyle modifications, exercise (30 minutes 5 times a week) for optimal blood pressure control and cardiovascular risk reduction  Handout provided    7. Normocytic anemia due to blood loss  Due to donating blood. Recheck hgb. - Hemoglobin; Future    8. Screen for colon cancer  - [facility] - Xiao Buitrago MD, Gastroenterology (ERCP & EUS), [facility]           Return in about 6 months (around 8/7/2023) for Physical .      No future appointments. Patient given educational materials - see patient instructions. Discussed use, benefit, and sideeffects of prescribed medications. All patient questions answered. Pt voiced understanding. Reviewed health maintenance. Instructed to continue current medications, diet and exercise. Patient agreed with treatment plan. Follow up as directed.      Electronically signed by Abdiel Ferrara DO on 2/7/2023 at 11:33 AM

## 2023-03-23 DIAGNOSIS — E03.8 SUBCLINICAL HYPOTHYROIDISM: ICD-10-CM

## 2023-03-23 DIAGNOSIS — D50.0 NORMOCYTIC ANEMIA DUE TO BLOOD LOSS: ICD-10-CM

## 2023-03-23 LAB
HGB BLD-MCNC: 12.3 G/DL (ref 13.5–17.5)
THYROPEROXIDASE AB SERPL IA-ACNC: 73 IU/ML
TSH SERPL DL<=0.005 MIU/L-ACNC: 4.11 UIU/ML (ref 0.27–4.2)

## 2023-03-25 LAB — TSH RECEP AB SER-ACNC: <0.8 IU/L

## 2023-04-09 DIAGNOSIS — E03.8 SUBCLINICAL HYPOTHYROIDISM: ICD-10-CM

## 2023-04-10 RX ORDER — LEVOTHYROXINE SODIUM 0.03 MG/1
25 TABLET ORAL DAILY
Qty: 90 TABLET | Refills: 1 | Status: SHIPPED | OUTPATIENT
Start: 2023-04-10 | End: 2023-07-09

## 2023-08-17 DIAGNOSIS — R35.1 BENIGN PROSTATIC HYPERPLASIA WITH NOCTURIA: ICD-10-CM

## 2023-08-17 DIAGNOSIS — N40.1 BENIGN PROSTATIC HYPERPLASIA WITH NOCTURIA: ICD-10-CM

## 2023-08-17 RX ORDER — TAMSULOSIN HYDROCHLORIDE 0.4 MG/1
0.4 CAPSULE ORAL DAILY
Qty: 90 CAPSULE | OUTPATIENT
Start: 2023-08-17 | End: 2023-09-16

## 2023-08-17 RX ORDER — TAMSULOSIN HYDROCHLORIDE 0.4 MG/1
0.4 CAPSULE ORAL DAILY
Qty: 30 CAPSULE | Refills: 0 | Status: SHIPPED | OUTPATIENT
Start: 2023-08-17 | End: 2023-09-16

## 2023-08-17 NOTE — TELEPHONE ENCOUNTER
Medication:   Requested Prescriptions     Pending Prescriptions Disp Refills    tamsulosin (FLOMAX) 0.4 MG capsule [Pharmacy Med Name: TAMSULOSIN 0.4MG CAPSULES] 90 capsule      Sig: TAKE 1 CAPSULE BY MOUTH DAILY     Last Filled:      Last appt: 2/7/2023   Next appt: 8/30/2023    Last OARRS: No flowsheet data found.

## 2023-08-17 NOTE — TELEPHONE ENCOUNTER
[phone] (Daisy)   Called patient to notify him of Dr. Margo Hillman request for him to make an appointment. Patient allowed me to make an appointment for him.

## 2023-08-17 NOTE — TELEPHONE ENCOUNTER
Medication:   Requested Prescriptions     Pending Prescriptions Disp Refills    tamsulosin (FLOMAX) 0.4 MG capsule 90 capsule 1     Sig: Take 1 capsule by mouth daily        Last Filled:      Patient Phone Number: [phone] (home)     Last appt: 2/7/2023   Next appt: Visit date not found    Last OARRS: No flowsheet data found. Preferred Pharmacy:   [address] [facility] [id number] [place] [address] [phone] - F [phone]  [address] [place] [id number]  Phone: [phone] Fax: [phone]  Medication:   Requested Prescriptions     Pending Prescriptions Disp Refills    tamsulosin (FLOMAX) 0.4 MG capsule 90 capsule 1     Sig: Take 1 capsule by mouth daily        Last Filled:      Patient Phone Number: [phone] (home)     Last appt: 2/7/2023   Next appt: Visit date not found    Last OARRS: No flowsheet data found. Preferred Pharmacy:   [address] [facility] [id number] [place] [address] [phone] - F [phone]  [address] [place] [id number]  Phone: [phone] Fax: [phone]  Medication:   Requested Prescriptions     Pending Prescriptions Disp Refills    tamsulosin (FLOMAX) 0.4 MG capsule 90 capsule 1     Sig: Take 1 capsule by mouth daily        Last Filled:      Patient Phone Number: [phone] (home)     Last appt: 2/7/2023   Next appt: Visit date not found    Last OARRS: No flowsheet data found.     Preferred Pharmacy:   [address] [facility] [address] [place] [address] Road - F [phone]  [address] [facility] [address]  Phone: [phone] Fax: [phone]

## 2023-08-29 PROBLEM — E06.3 HASHIMOTO'S THYROIDITIS: Status: ACTIVE | Noted: 2023-02-07

## 2023-08-30 ENCOUNTER — OFFICE VISIT (OUTPATIENT)
Dept: PRIMARY CARE CLINIC | Age: 48
End: 2023-08-30
Payer: OTHER GOVERNMENT

## 2023-08-30 ENCOUNTER — TELEPHONE (OUTPATIENT)
Dept: PRIMARY CARE CLINIC | Age: 48
End: 2023-08-30

## 2023-08-30 VITALS
RESPIRATION RATE: 13 BRPM | HEART RATE: 85 BPM | BODY MASS INDEX: 29.42 KG/M2 | DIASTOLIC BLOOD PRESSURE: 68 MMHG | OXYGEN SATURATION: 99 % | HEIGHT: 73 IN | WEIGHT: 222 LBS | SYSTOLIC BLOOD PRESSURE: 100 MMHG | TEMPERATURE: 97.6 F

## 2023-08-30 DIAGNOSIS — R53.83 FATIGUE, UNSPECIFIED TYPE: ICD-10-CM

## 2023-08-30 DIAGNOSIS — E06.3 HASHIMOTO'S THYROIDITIS: ICD-10-CM

## 2023-08-30 DIAGNOSIS — D64.9 NORMOCYTIC ANEMIA: ICD-10-CM

## 2023-08-30 DIAGNOSIS — K21.9 GASTROESOPHAGEAL REFLUX DISEASE, UNSPECIFIED WHETHER ESOPHAGITIS PRESENT: ICD-10-CM

## 2023-08-30 DIAGNOSIS — R53.83 FATIGUE, UNSPECIFIED TYPE: Primary | ICD-10-CM

## 2023-08-30 DIAGNOSIS — N40.1 BENIGN PROSTATIC HYPERPLASIA WITH NOCTURIA: ICD-10-CM

## 2023-08-30 DIAGNOSIS — R35.1 BENIGN PROSTATIC HYPERPLASIA WITH NOCTURIA: ICD-10-CM

## 2023-08-30 PROBLEM — R05.3 CHRONIC COUGH: Status: RESOLVED | Noted: 2023-01-17 | Resolved: 2023-08-30

## 2023-08-30 LAB
ALBUMIN SERPL-MCNC: 4.7 G/DL (ref 3.4–5)
ALBUMIN/GLOB SERPL: 1.7 {RATIO} (ref 1.1–2.2)
ALP SERPL-CCNC: 67 U/L (ref 40–129)
ALT SERPL-CCNC: 24 U/L (ref 10–40)
ANION GAP SERPL CALCULATED.3IONS-SCNC: 11 MMOL/L (ref 3–16)
AST SERPL-CCNC: 20 U/L (ref 15–37)
BASOPHILS # BLD: 0 K/UL (ref 0–0.2)
BASOPHILS NFR BLD: 0.8 %
BILIRUB SERPL-MCNC: 0.5 MG/DL (ref 0–1)
BUN SERPL-MCNC: 13 MG/DL (ref 7–20)
CALCIUM SERPL-MCNC: 9.8 MG/DL (ref 8.3–10.6)
CHLORIDE SERPL-SCNC: 104 MMOL/L (ref 99–110)
CO2 SERPL-SCNC: 25 MMOL/L (ref 21–32)
CREAT SERPL-MCNC: 1.2 MG/DL (ref 0.9–1.3)
DEPRECATED RDW RBC AUTO: 14.3 % (ref 12.4–15.4)
EOSINOPHIL # BLD: 0.3 K/UL (ref 0–0.6)
EOSINOPHIL NFR BLD: 4.6 %
FERRITIN SERPL IA-MCNC: 54.1 NG/ML (ref 30–400)
GFR SERPLBLD CREATININE-BSD FMLA CKD-EPI: >60 ML/MIN/{1.73_M2}
GLUCOSE SERPL-MCNC: 131 MG/DL (ref 70–99)
HCT VFR BLD AUTO: 44 % (ref 40.5–52.5)
HGB BLD-MCNC: 14.6 G/DL (ref 13.5–17.5)
IRON SATN MFR SERPL: 37 % (ref 20–50)
IRON SERPL-MCNC: 145 UG/DL (ref 59–158)
LYMPHOCYTES # BLD: 2.4 K/UL (ref 1–5.1)
LYMPHOCYTES NFR BLD: 38.8 %
MCH RBC QN AUTO: 29.4 PG (ref 26–34)
MCHC RBC AUTO-ENTMCNC: 33.2 G/DL (ref 31–36)
MCV RBC AUTO: 88.5 FL (ref 80–100)
MONOCYTES # BLD: 0.6 K/UL (ref 0–1.3)
MONOCYTES NFR BLD: 9 %
NEUTROPHILS # BLD: 2.9 K/UL (ref 1.7–7.7)
NEUTROPHILS NFR BLD: 46.8 %
PLATELET # BLD AUTO: 397 K/UL (ref 135–450)
PMV BLD AUTO: 8.6 FL (ref 5–10.5)
POTASSIUM SERPL-SCNC: 4.4 MMOL/L (ref 3.5–5.1)
PROT SERPL-MCNC: 7.5 G/DL (ref 6.4–8.2)
RBC # BLD AUTO: 4.97 M/UL (ref 4.2–5.9)
SODIUM SERPL-SCNC: 140 MMOL/L (ref 136–145)
TIBC SERPL-MCNC: 388 UG/DL (ref 260–445)
TSH SERPL DL<=0.005 MIU/L-ACNC: 3.03 UIU/ML (ref 0.27–4.2)
WBC # BLD AUTO: 6.2 K/UL (ref 4–11)

## 2023-08-30 PROCEDURE — 99214 OFFICE O/P EST MOD 30 MIN: CPT | Performed by: STUDENT IN AN ORGANIZED HEALTH CARE EDUCATION/TRAINING PROGRAM

## 2023-08-30 RX ORDER — TAMSULOSIN HYDROCHLORIDE 0.4 MG/1
0.4 CAPSULE ORAL DAILY
Qty: 90 CAPSULE | Refills: 1 | Status: SHIPPED | OUTPATIENT
Start: 2023-08-30 | End: 2024-02-26

## 2023-08-31 DIAGNOSIS — E03.8 SUBCLINICAL HYPOTHYROIDISM: ICD-10-CM

## 2023-08-31 DIAGNOSIS — E06.3 HASHIMOTO'S THYROIDITIS: Primary | ICD-10-CM

## 2023-08-31 LAB
FOLATE SERPL-MCNC: 15.58 NG/ML (ref 4.78–24.2)
VIT B12 SERPL-MCNC: 633 PG/ML (ref 211–911)

## 2023-08-31 RX ORDER — LEVOTHYROXINE SODIUM 0.05 MG/1
50 TABLET ORAL DAILY
Qty: 90 TABLET | Refills: 0 | Status: SHIPPED | OUTPATIENT
Start: 2023-08-31 | End: 2023-11-29

## 2023-11-08 DIAGNOSIS — E06.3 HASHIMOTO'S THYROIDITIS: ICD-10-CM

## 2023-11-08 DIAGNOSIS — E03.8 SUBCLINICAL HYPOTHYROIDISM: ICD-10-CM

## 2023-11-08 LAB — TSH SERPL DL<=0.005 MIU/L-ACNC: 6.41 UIU/ML (ref 0.27–4.2)

## 2023-11-09 DIAGNOSIS — E03.8 SUBCLINICAL HYPOTHYROIDISM: ICD-10-CM

## 2023-11-09 DIAGNOSIS — E06.3 HASHIMOTO'S THYROIDITIS: ICD-10-CM

## 2023-11-09 RX ORDER — LEVOTHYROXINE SODIUM 0.07 MG/1
75 TABLET ORAL DAILY
Qty: 90 TABLET | Refills: 0 | Status: SHIPPED | OUTPATIENT
Start: 2023-11-09 | End: 2024-02-07

## 2024-02-06 DIAGNOSIS — E06.3 HASHIMOTO'S THYROIDITIS: ICD-10-CM

## 2024-02-06 DIAGNOSIS — E03.8 SUBCLINICAL HYPOTHYROIDISM: ICD-10-CM

## 2024-02-06 RX ORDER — LEVOTHYROXINE SODIUM 0.07 MG/1
75 TABLET ORAL DAILY
Qty: 30 TABLET | Refills: 0 | Status: SHIPPED | OUTPATIENT
Start: 2024-02-06 | End: 2024-05-06

## 2024-02-06 NOTE — TELEPHONE ENCOUNTER
Medication:   Requested Prescriptions     Pending Prescriptions Disp Refills    levothyroxine (SYNTHROID) 75 MCG tablet [Pharmacy Med Name: LEVOTHYROXINE 0.075MG (75MCG) TABS] 90 tablet 0     Sig: TAKE 1 TABLET BY MOUTH DAILY     Last Filled:  11.9.23    Last appt: 8/30/2023   Next appt: Visit date not found    Return in about 6 months (around 2/29/2024) for Physical .  Last OARRS:        No data to display

## 2024-02-06 NOTE — TELEPHONE ENCOUNTER
Please call patient and tell him to come have his TSH checked. Lab is ordered. 30 days of Rx sent.

## 2024-02-07 DIAGNOSIS — E03.8 SUBCLINICAL HYPOTHYROIDISM: ICD-10-CM

## 2024-02-07 DIAGNOSIS — E06.3 HASHIMOTO'S THYROIDITIS: ICD-10-CM

## 2024-02-08 LAB — TSH SERPL DL<=0.005 MIU/L-ACNC: 3.45 UIU/ML (ref 0.27–4.2)

## 2024-02-09 ENCOUNTER — PATIENT MESSAGE (OUTPATIENT)
Dept: PRIMARY CARE CLINIC | Age: 49
End: 2024-02-09

## 2024-02-09 DIAGNOSIS — E03.8 SUBCLINICAL HYPOTHYROIDISM: ICD-10-CM

## 2024-02-09 DIAGNOSIS — E06.3 HASHIMOTO'S THYROIDITIS: ICD-10-CM

## 2024-02-09 NOTE — TELEPHONE ENCOUNTER
From: Jose Miguel Oropeza  To: Dr. Mary Suarez  Sent: 2/9/2024 7:33 AM EST  Subject: TSH    I mostly feel fine. I have some dry skin, but that's normal for the winter and my energy level seems fine. I honestly haven't noticed any kind of change with the 75 mcg pills. I have about 30 pills of 50 mcg of levothyroxine left. Should I try taking 2 of those instead just to see if I notice any difference? I'm okay either way, I think.

## 2024-03-18 DIAGNOSIS — R35.1 BENIGN PROSTATIC HYPERPLASIA WITH NOCTURIA: ICD-10-CM

## 2024-03-18 DIAGNOSIS — N40.1 BENIGN PROSTATIC HYPERPLASIA WITH NOCTURIA: ICD-10-CM

## 2024-03-18 RX ORDER — LEVOTHYROXINE SODIUM 0.12 MG/1
125 TABLET ORAL DAILY
Qty: 90 TABLET | Refills: 0 | Status: SHIPPED | OUTPATIENT
Start: 2024-03-18 | End: 2024-06-16

## 2024-03-18 RX ORDER — TAMSULOSIN HYDROCHLORIDE 0.4 MG/1
0.4 CAPSULE ORAL DAILY
Qty: 90 CAPSULE | Refills: 1 | Status: SHIPPED | OUTPATIENT
Start: 2024-03-18 | End: 2024-09-14

## 2024-03-18 SDOH — ECONOMIC STABILITY: FOOD INSECURITY: WITHIN THE PAST 12 MONTHS, THE FOOD YOU BOUGHT JUST DIDN'T LAST AND YOU DIDN'T HAVE MONEY TO GET MORE.: NEVER TRUE

## 2024-03-18 SDOH — ECONOMIC STABILITY: INCOME INSECURITY: HOW HARD IS IT FOR YOU TO PAY FOR THE VERY BASICS LIKE FOOD, HOUSING, MEDICAL CARE, AND HEATING?: NOT HARD AT ALL

## 2024-03-18 SDOH — ECONOMIC STABILITY: FOOD INSECURITY: WITHIN THE PAST 12 MONTHS, YOU WORRIED THAT YOUR FOOD WOULD RUN OUT BEFORE YOU GOT MONEY TO BUY MORE.: NEVER TRUE

## 2024-03-18 SDOH — ECONOMIC STABILITY: TRANSPORTATION INSECURITY
IN THE PAST 12 MONTHS, HAS LACK OF TRANSPORTATION KEPT YOU FROM MEETINGS, WORK, OR FROM GETTING THINGS NEEDED FOR DAILY LIVING?: NO

## 2024-03-18 ASSESSMENT — PATIENT HEALTH QUESTIONNAIRE - PHQ9
SUM OF ALL RESPONSES TO PHQ QUESTIONS 1-9: 1
2. FEELING DOWN, DEPRESSED OR HOPELESS: NOT AT ALL
SUM OF ALL RESPONSES TO PHQ9 QUESTIONS 1 & 2: 1
1. LITTLE INTEREST OR PLEASURE IN DOING THINGS: SEVERAL DAYS
SUM OF ALL RESPONSES TO PHQ9 QUESTIONS 1 & 2: 1
SUM OF ALL RESPONSES TO PHQ QUESTIONS 1-9: 1
1. LITTLE INTEREST OR PLEASURE IN DOING THINGS: SEVERAL DAYS
SUM OF ALL RESPONSES TO PHQ QUESTIONS 1-9: 1
2. FEELING DOWN, DEPRESSED OR HOPELESS: NOT AT ALL
SUM OF ALL RESPONSES TO PHQ QUESTIONS 1-9: 1

## 2024-03-18 NOTE — TELEPHONE ENCOUNTER
Medication:   Requested Prescriptions     Pending Prescriptions Disp Refills    tamsulosin (FLOMAX) 0.4 MG capsule 90 capsule 1     Sig: Take 1 capsule by mouth daily     Last Filled:  08/30/2023    Last appt: 8/30/2023   Next appt: 03/21/2024    Last OARRS:        No data to display

## 2024-03-21 ENCOUNTER — OFFICE VISIT (OUTPATIENT)
Dept: PRIMARY CARE CLINIC | Age: 49
End: 2024-03-21
Payer: OTHER GOVERNMENT

## 2024-03-21 VITALS
DIASTOLIC BLOOD PRESSURE: 78 MMHG | RESPIRATION RATE: 16 BRPM | OXYGEN SATURATION: 96 % | BODY MASS INDEX: 29.55 KG/M2 | SYSTOLIC BLOOD PRESSURE: 102 MMHG | WEIGHT: 223 LBS | TEMPERATURE: 96.8 F | HEART RATE: 64 BPM | HEIGHT: 73 IN

## 2024-03-21 DIAGNOSIS — Z78.9 HEPATITIS B VACCINATION STATUS UNKNOWN: ICD-10-CM

## 2024-03-21 DIAGNOSIS — R35.1 BENIGN PROSTATIC HYPERPLASIA WITH NOCTURIA: ICD-10-CM

## 2024-03-21 DIAGNOSIS — N40.1 BENIGN PROSTATIC HYPERPLASIA WITH NOCTURIA: ICD-10-CM

## 2024-03-21 DIAGNOSIS — E78.00 ELEVATED CHOLESTEROL: ICD-10-CM

## 2024-03-21 DIAGNOSIS — L71.9 ROSACEA: ICD-10-CM

## 2024-03-21 DIAGNOSIS — K21.9 GASTROESOPHAGEAL REFLUX DISEASE, UNSPECIFIED WHETHER ESOPHAGITIS PRESENT: ICD-10-CM

## 2024-03-21 DIAGNOSIS — E06.3 HASHIMOTO'S THYROIDITIS: Primary | ICD-10-CM

## 2024-03-21 PROCEDURE — 99214 OFFICE O/P EST MOD 30 MIN: CPT | Performed by: STUDENT IN AN ORGANIZED HEALTH CARE EDUCATION/TRAINING PROGRAM

## 2024-03-21 RX ORDER — KETOCONAZOLE 20 MG/G
CREAM TOPICAL
COMMUNITY
Start: 2024-03-19

## 2024-03-21 NOTE — PROGRESS NOTES
[facility]  [address]  [address] [place] [id number]  Dept: [phone]  Dept Fax: [phone]  Loc: [phone]      Jose Miguel Oropeza is a 48 y.o. male who presents today for:  Chief Complaint   Patient presents with    Follow-up     HPI:   Jose Miguel Oropeza is 48 y.o. with a history of BPH, ED, GERD who presents today for follow up on chronic conditions.    Mom with history of myelodysplastic syndrome.      Hypothyroidism - energy level is better on increased dose  Chronic cough 2/2 GERD. Still taking prilosec 20 mg OTC and working.   Rosacea - per Dermatology   BPH - chronic, controlled with flomax.     Colonoscopy last year with Dr. Adorno - 10 year recall.       Objective:     Vitals:    03/21/24 0841   BP: 102/78   Pulse: 64   Resp: 16   Temp: 96.8 °F (36 °C)   SpO2: 96%     Wt Readings from Last 3 Encounters:   03/21/24 101.2 kg (223 lb)   08/30/23 100.7 kg (222 lb)   02/07/23 99.2 kg (218 lb 12.8 oz)       BP Readings from Last 3 Encounters:   03/21/24 102/78   08/30/23 100/68   02/07/23 106/70       Lab Results   Component Value Date    WBC 6.2 08/30/2023    HGB 14.6 08/30/2023    HCT 44.0 08/30/2023    MCV 88.5 08/30/2023     08/30/2023     Lab Results   Component Value Date     08/30/2023    K 4.4 08/30/2023     08/30/2023    CO2 25 08/30/2023    BUN 13 08/30/2023    CREATININE 1.2 08/30/2023    GLUCOSE 131 (H) 08/30/2023    CALCIUM 9.8 08/30/2023    PROT 7.5 08/30/2023    LABALBU 4.7 08/30/2023    BILITOT 0.5 08/30/2023    ALKPHOS 67 08/30/2023    AST 20 08/30/2023    ALT 24 08/30/2023    LABGLOM >60 08/30/2023    GFRAA >60 12/22/2013    AGRATIO 1.7 08/30/2023     Lab Results   Component Value Date    TSH 3.45 02/07/2024    T4FREE 1.1 01/20/2023     Lab Results   Component Value Date    HDL 51 01/20/2023     Lab Results   Component Value Date    LDLCALC 135 (H) 01/20/2023     The 10-year ASCVD risk score (Omar MORGAN, et al., 2019)

## 2024-06-03 DIAGNOSIS — E06.3 HASHIMOTO'S THYROIDITIS: ICD-10-CM

## 2024-06-03 LAB — TSH SERPL DL<=0.005 MIU/L-ACNC: 2.11 UIU/ML (ref 0.27–4.2)

## 2024-06-14 DIAGNOSIS — E03.8 SUBCLINICAL HYPOTHYROIDISM: ICD-10-CM

## 2024-06-14 DIAGNOSIS — E06.3 HASHIMOTO'S THYROIDITIS: ICD-10-CM

## 2024-06-14 RX ORDER — LEVOTHYROXINE SODIUM 0.12 MG/1
125 TABLET ORAL DAILY
Qty: 90 TABLET | Refills: 3 | Status: SHIPPED | OUTPATIENT
Start: 2024-06-14 | End: 2025-06-09

## 2024-06-14 NOTE — TELEPHONE ENCOUNTER
Medication:   Requested Prescriptions     Pending Prescriptions Disp Refills    levothyroxine (SYNTHROID) 125 MCG tablet [Pharmacy Med Name: LEVOTHYROXINE 0.125MG (125MCG) TAB] 90 tablet 0     Sig: TAKE 1 TABLET BY MOUTH DAILY     Last Filled:  3.18.24    Last appt: 3/21/2024   Next appt: Visit date not found    Last OARRS:        No data to display

## 2024-09-03 ENCOUNTER — PATIENT MESSAGE (OUTPATIENT)
Dept: PRIMARY CARE CLINIC | Age: 49
End: 2024-09-03

## 2024-09-13 DIAGNOSIS — N40.1 BENIGN PROSTATIC HYPERPLASIA WITH NOCTURIA: ICD-10-CM

## 2024-09-13 DIAGNOSIS — R35.1 BENIGN PROSTATIC HYPERPLASIA WITH NOCTURIA: ICD-10-CM

## 2024-09-13 RX ORDER — TAMSULOSIN HYDROCHLORIDE 0.4 MG/1
0.4 CAPSULE ORAL DAILY
Qty: 30 CAPSULE | Refills: 0 | Status: SHIPPED | OUTPATIENT
Start: 2024-09-13 | End: 2025-03-12

## 2024-09-26 DIAGNOSIS — K21.9 GASTROESOPHAGEAL REFLUX DISEASE, UNSPECIFIED WHETHER ESOPHAGITIS PRESENT: ICD-10-CM

## 2024-09-26 DIAGNOSIS — E06.3 HASHIMOTO'S THYROIDITIS: ICD-10-CM

## 2024-09-26 DIAGNOSIS — E78.00 ELEVATED CHOLESTEROL: ICD-10-CM

## 2024-09-26 DIAGNOSIS — Z78.9 HEPATITIS B VACCINATION STATUS UNKNOWN: ICD-10-CM

## 2024-09-26 LAB
25(OH)D3 SERPL-MCNC: 25.9 NG/ML
ALBUMIN SERPL-MCNC: 4.4 G/DL (ref 3.4–5)
ALBUMIN/GLOB SERPL: 1.8 {RATIO} (ref 1.1–2.2)
ALP SERPL-CCNC: 66 U/L (ref 40–129)
ALT SERPL-CCNC: 36 U/L (ref 10–40)
ANION GAP SERPL CALCULATED.3IONS-SCNC: 10 MMOL/L (ref 3–16)
AST SERPL-CCNC: 27 U/L (ref 15–37)
BASOPHILS # BLD: 0 K/UL (ref 0–0.2)
BASOPHILS NFR BLD: 0.7 %
BILIRUB SERPL-MCNC: 0.4 MG/DL (ref 0–1)
BUN SERPL-MCNC: 15 MG/DL (ref 7–20)
CALCIUM SERPL-MCNC: 9.8 MG/DL (ref 8.3–10.6)
CHLORIDE SERPL-SCNC: 103 MMOL/L (ref 99–110)
CHOLEST SERPL-MCNC: 200 MG/DL (ref 0–199)
CO2 SERPL-SCNC: 26 MMOL/L (ref 21–32)
CREAT SERPL-MCNC: 1.1 MG/DL (ref 0.9–1.3)
DEPRECATED RDW RBC AUTO: 12.8 % (ref 12.4–15.4)
EOSINOPHIL # BLD: 0.3 K/UL (ref 0–0.6)
EOSINOPHIL NFR BLD: 4.6 %
FOLATE SERPL-MCNC: 15 NG/ML (ref 4.78–24.2)
GFR SERPLBLD CREATININE-BSD FMLA CKD-EPI: 83 ML/MIN/{1.73_M2}
GLUCOSE P FAST SERPL-MCNC: 87 MG/DL (ref 70–99)
HBV SURFACE AB SERPL IA-ACNC: <3.5 MIU/ML
HCT VFR BLD AUTO: 42.8 % (ref 40.5–52.5)
HDLC SERPL-MCNC: 45 MG/DL (ref 40–60)
HGB BLD-MCNC: 14.7 G/DL (ref 13.5–17.5)
LDLC SERPL CALC-MCNC: 130 MG/DL
LYMPHOCYTES # BLD: 2.4 K/UL (ref 1–5.1)
LYMPHOCYTES NFR BLD: 39.6 %
MCH RBC QN AUTO: 30.8 PG (ref 26–34)
MCHC RBC AUTO-ENTMCNC: 34.4 G/DL (ref 31–36)
MCV RBC AUTO: 89.5 FL (ref 80–100)
MONOCYTES # BLD: 0.6 K/UL (ref 0–1.3)
MONOCYTES NFR BLD: 9.2 %
NEUTROPHILS # BLD: 2.8 K/UL (ref 1.7–7.7)
NEUTROPHILS NFR BLD: 45.9 %
PLATELET # BLD AUTO: 282 K/UL (ref 135–450)
PMV BLD AUTO: 9.2 FL (ref 5–10.5)
POTASSIUM SERPL-SCNC: 4.6 MMOL/L (ref 3.5–5.1)
PROT SERPL-MCNC: 6.9 G/DL (ref 6.4–8.2)
RBC # BLD AUTO: 4.78 M/UL (ref 4.2–5.9)
SODIUM SERPL-SCNC: 139 MMOL/L (ref 136–145)
TRIGL SERPL-MCNC: 124 MG/DL (ref 0–150)
TSH SERPL DL<=0.005 MIU/L-ACNC: 2.62 UIU/ML (ref 0.27–4.2)
VIT B12 SERPL-MCNC: 495 PG/ML (ref 211–911)
VLDLC SERPL CALC-MCNC: 25 MG/DL
WBC # BLD AUTO: 6.1 K/UL (ref 4–11)

## 2024-09-27 DIAGNOSIS — R35.1 BENIGN PROSTATIC HYPERPLASIA WITH NOCTURIA: ICD-10-CM

## 2024-09-27 DIAGNOSIS — N40.1 BENIGN PROSTATIC HYPERPLASIA WITH NOCTURIA: ICD-10-CM

## 2024-09-27 RX ORDER — TAMSULOSIN HYDROCHLORIDE 0.4 MG/1
0.4 CAPSULE ORAL DAILY
Qty: 90 CAPSULE | Refills: 1 | Status: SHIPPED | OUTPATIENT
Start: 2024-09-27 | End: 2025-03-26

## 2025-04-13 DIAGNOSIS — R35.1 BENIGN PROSTATIC HYPERPLASIA WITH NOCTURIA: ICD-10-CM

## 2025-04-13 DIAGNOSIS — N40.1 BENIGN PROSTATIC HYPERPLASIA WITH NOCTURIA: ICD-10-CM

## 2025-04-14 RX ORDER — TAMSULOSIN HYDROCHLORIDE 0.4 MG/1
0.4 CAPSULE ORAL DAILY
Qty: 30 CAPSULE | Refills: 0 | Status: SHIPPED | OUTPATIENT
Start: 2025-04-14 | End: 2025-10-11

## 2025-04-14 NOTE — TELEPHONE ENCOUNTER
4/24/2025 12:00 PM (30 min)  Awa   PHYSICAL   [facility] ([facility])   Mary Suarez, DO

## 2025-04-14 NOTE — TELEPHONE ENCOUNTER
Patient has not been seen by PCP within last 12 months. Have sent 30 day supply for now. Please call and schedule patient for appt. Thank you.

## 2025-04-14 NOTE — TELEPHONE ENCOUNTER
Refill Request       Last Seen: Last Seen Department: 3/21/2024  Last Seen by PCP: 3/21/2024    Last Written: 9/27/24 90 with 1    Next Appointment:   No future appointments.      Requested Prescriptions     Pending Prescriptions Disp Refills    tamsulosin (FLOMAX) 0.4 MG capsule [Pharmacy Med Name: TAMSULOSIN 0.4MG CAPSULES] 90 capsule 1     Sig: TAKE 1 CAPSULE BY MOUTH DAILY

## 2025-04-23 ASSESSMENT — PATIENT HEALTH QUESTIONNAIRE - PHQ9
SUM OF ALL RESPONSES TO PHQ QUESTIONS 1-9: 1
SUM OF ALL RESPONSES TO PHQ QUESTIONS 1-9: 1
2. FEELING DOWN, DEPRESSED OR HOPELESS: SEVERAL DAYS
SUM OF ALL RESPONSES TO PHQ QUESTIONS 1-9: 1
1. LITTLE INTEREST OR PLEASURE IN DOING THINGS: NOT AT ALL
SUM OF ALL RESPONSES TO PHQ QUESTIONS 1-9: 1
2. FEELING DOWN, DEPRESSED OR HOPELESS: SEVERAL DAYS
SUM OF ALL RESPONSES TO PHQ9 QUESTIONS 1 & 2: 1
1. LITTLE INTEREST OR PLEASURE IN DOING THINGS: NOT AT ALL

## 2025-04-24 ENCOUNTER — OFFICE VISIT (OUTPATIENT)
Dept: PRIMARY CARE CLINIC | Age: 50
End: 2025-04-24
Payer: OTHER GOVERNMENT

## 2025-04-24 VITALS
BODY MASS INDEX: 28.1 KG/M2 | DIASTOLIC BLOOD PRESSURE: 60 MMHG | WEIGHT: 212 LBS | OXYGEN SATURATION: 97 % | HEIGHT: 73 IN | HEART RATE: 82 BPM | SYSTOLIC BLOOD PRESSURE: 90 MMHG

## 2025-04-24 DIAGNOSIS — K21.9 GASTROESOPHAGEAL REFLUX DISEASE, UNSPECIFIED WHETHER ESOPHAGITIS PRESENT: ICD-10-CM

## 2025-04-24 DIAGNOSIS — E06.3 HASHIMOTO'S THYROIDITIS: ICD-10-CM

## 2025-04-24 DIAGNOSIS — N40.1 BENIGN PROSTATIC HYPERPLASIA WITH NOCTURIA: ICD-10-CM

## 2025-04-24 DIAGNOSIS — Z23 NEED FOR HEPATITIS B VACCINATION: ICD-10-CM

## 2025-04-24 DIAGNOSIS — E78.00 ELEVATED CHOLESTEROL: ICD-10-CM

## 2025-04-24 DIAGNOSIS — Z00.00 ENCOUNTER FOR WELLNESS EXAMINATION IN ADULT: Primary | ICD-10-CM

## 2025-04-24 DIAGNOSIS — R35.1 BENIGN PROSTATIC HYPERPLASIA WITH NOCTURIA: ICD-10-CM

## 2025-04-24 DIAGNOSIS — Z13.1 SCREENING FOR DIABETES MELLITUS: ICD-10-CM

## 2025-04-24 DIAGNOSIS — E03.8 SUBCLINICAL HYPOTHYROIDISM: ICD-10-CM

## 2025-04-24 PROCEDURE — 99396 PREV VISIT EST AGE 40-64: CPT | Performed by: STUDENT IN AN ORGANIZED HEALTH CARE EDUCATION/TRAINING PROGRAM

## 2025-04-24 PROCEDURE — 90739 HEPB VACC 2/4 DOSE ADULT IM: CPT | Performed by: STUDENT IN AN ORGANIZED HEALTH CARE EDUCATION/TRAINING PROGRAM

## 2025-04-24 PROCEDURE — 90471 IMMUNIZATION ADMIN: CPT | Performed by: STUDENT IN AN ORGANIZED HEALTH CARE EDUCATION/TRAINING PROGRAM

## 2025-04-24 RX ORDER — LEVOTHYROXINE SODIUM 125 UG/1
125 TABLET ORAL DAILY
Qty: 90 TABLET | Refills: 1 | Status: SHIPPED | OUTPATIENT
Start: 2025-06-06 | End: 2025-12-03

## 2025-04-24 RX ORDER — TAMSULOSIN HYDROCHLORIDE 0.4 MG/1
0.4 CAPSULE ORAL DAILY
Qty: 90 CAPSULE | Refills: 1 | Status: SHIPPED | OUTPATIENT
Start: 2025-05-24 | End: 2025-11-20

## 2025-04-24 SDOH — ECONOMIC STABILITY: FOOD INSECURITY: WITHIN THE PAST 12 MONTHS, THE FOOD YOU BOUGHT JUST DIDN'T LAST AND YOU DIDN'T HAVE MONEY TO GET MORE.: NEVER TRUE

## 2025-04-24 SDOH — ECONOMIC STABILITY: FOOD INSECURITY: WITHIN THE PAST 12 MONTHS, YOU WORRIED THAT YOUR FOOD WOULD RUN OUT BEFORE YOU GOT MONEY TO BUY MORE.: NEVER TRUE

## 2025-04-24 NOTE — PROGRESS NOTES
[facility]  [address]  [address]  [facility] [address]  Dept: [phone]  Dept Fax: [phone]  Loc: [phone]      Jose Miguel Oropeza is a 49 y.o. male who presents today for:  Chief Complaint   Patient presents with    Annual Exam     HPI:     Mom with history of myelodysplastic syndrome.      Hypothyroidism - energy level is better on increased dose, 125 mcg daily   GERD. Still taking prilosec 20 mg OTC and working.   Rosacea - per Dermatology   BPH - chronic, controlled with flomax. Nocturia is 2x/night. Does not want further intervention at this time.   Seasonal allergies - allegra PRN, not often    Wellness:   Exercise - walking   Diet - 20 sodas/week, reports he does not drink enough water  Sleep - fair; worse due to less exercise recently   Dentist - yes  Specialists - seeing therapist monthly   Stressors/ barriers to wellness - none    HM:   Vaccines - due for hep b  Colon Ca Screen - Colonoscopy 2023 with Dr. Adorno - 10 year recall.           Objective:     Vitals:    04/24/25 1152   BP: 90/60   Pulse: 82   SpO2: 97%     Wt Readings from Last 3 Encounters:   04/24/25 96.2 kg (212 lb)   03/21/24 101.2 kg (223 lb)   08/30/23 100.7 kg (222 lb)       BP Readings from Last 3 Encounters:   04/24/25 90/60   03/21/24 102/78   08/30/23 100/68       Lab Results   Component Value Date    WBC 6.1 09/26/2024    HGB 14.7 09/26/2024    HCT 42.8 09/26/2024    MCV 89.5 09/26/2024     09/26/2024     Lab Results   Component Value Date     09/26/2024    K 4.6 09/26/2024     09/26/2024    CO2 26 09/26/2024    BUN 15 09/26/2024    CREATININE 1.1 09/26/2024    GLUCOSE 131 (H) 08/30/2023    CALCIUM 9.8 09/26/2024    BILITOT 0.4 09/26/2024    ALKPHOS 66 09/26/2024    AST 27 09/26/2024    ALT 36 09/26/2024    LABGLOM 83 09/26/2024    GFRAA >60 12/22/2013    AGRATIO 1.8 09/26/2024     Lab Results   Component Value Date    TSH 3.45 02/07/2024    T4FREE 1.1

## 2025-04-24 NOTE — PATIENT INSTRUCTIONS
I encourage exercise (30 minutes 5 times a week) for optimal blood pressure control and cardiovascular risk reduction  Encourage mediterranean diet, (lean proteins, lots of vegetables and fruit, healthy fats - like nuts, oils, avocado; and limited carbohydrates)  Alcohol should be in moderation, 1-2 a night. Any alcohol does increase your risk for heart disease.   Encourage healthy sleep habits and at least 6-8 hours of sleep per night  F/u as scheduled with dentist and specialists if applicable     Preventative Plan By Age:     35+:  Labs: Cholesterol, metabolic panel  Immunizations: Annual Flu vaccine, TDAP every 10 years  Cancer screening: Cervical cancer screening every 3-5 years     40+:   Labs: cholesterol, metabolic panel   Immunizations: Annual Flu vaccine, TDAP every 10 years  Cancer screening: Mammogram yearly, Cervical cancer screening every 3-5 years     45+:  Labs: cholesterol, metabolic panel   Immunizations: Annual Flu vaccine, TDAP every 10 years  Cancer screening: Mammogram yearly, Cervical cancer screening every 3-5 years, Colon cancer screening every 3-10 years    50+:   Labs: cholesterol, metabolic panel   Immunizations: Annual Flu vaccine, TDAP every 10 years, Shingles vaccine (2 doses 4-6 months apart)  Cancer screening: Mammogram yearly, Cervical cancer screening every 3-5 years, Colon cancer screening every 3-10 years, Lung cancer screening if smoker/history of smoking    60+:  Labs: cholesterol, metabolic panel   Immunizations: Annual Flu vaccine, TDAP every 10 years, Shingles vaccine (2 doses 4-6 months apart), Pneumonia vaccine at 65, RSV vaccine  Cancer screening: Mammogram yearly, Cervical cancer screening every 3-5 years, Colon cancer screening every 3-10 years, Lung cancer screening if smoker/history of smoking, Prostate cancer screening yearly        Please find our [facility] Lab Location Guide below for your convenience!    CENTRAL LOCATIONS    1) [facility] - Saturday hours

## 2025-04-25 ASSESSMENT — ENCOUNTER SYMPTOMS
EYE PAIN: 0
ABDOMINAL PAIN: 0
COUGH: 0
BLOOD IN STOOL: 0
CONSTIPATION: 0
SHORTNESS OF BREATH: 0
DIARRHEA: 0
TROUBLE SWALLOWING: 0

## 2025-06-04 ENCOUNTER — PATIENT MESSAGE (OUTPATIENT)
Dept: PRIMARY CARE CLINIC | Age: 50
End: 2025-06-04

## 2025-06-04 DIAGNOSIS — E03.8 SUBCLINICAL HYPOTHYROIDISM: ICD-10-CM

## 2025-06-04 DIAGNOSIS — E06.3 HASHIMOTO'S THYROIDITIS: ICD-10-CM

## 2025-06-04 RX ORDER — LEVOTHYROXINE SODIUM 125 UG/1
125 TABLET ORAL DAILY
Qty: 90 TABLET | Refills: 3 | Status: SHIPPED | OUTPATIENT
Start: 2025-06-06 | End: 2026-06-01